# Patient Record
Sex: MALE | Race: WHITE | NOT HISPANIC OR LATINO
[De-identification: names, ages, dates, MRNs, and addresses within clinical notes are randomized per-mention and may not be internally consistent; named-entity substitution may affect disease eponyms.]

---

## 2022-01-10 ENCOUNTER — TRANSCRIPTION ENCOUNTER (OUTPATIENT)
Age: 52
End: 2022-01-10

## 2022-10-03 ENCOUNTER — TRANSCRIPTION ENCOUNTER (OUTPATIENT)
Age: 52
End: 2022-10-03

## 2022-10-04 PROBLEM — Z00.00 ENCOUNTER FOR PREVENTIVE HEALTH EXAMINATION: Status: ACTIVE | Noted: 2022-10-04

## 2022-10-10 ENCOUNTER — APPOINTMENT (OUTPATIENT)
Dept: CARDIOLOGY | Facility: CLINIC | Age: 52
End: 2022-10-10

## 2022-10-10 ENCOUNTER — NON-APPOINTMENT (OUTPATIENT)
Age: 52
End: 2022-10-10

## 2022-10-10 VITALS — DIASTOLIC BLOOD PRESSURE: 90 MMHG | SYSTOLIC BLOOD PRESSURE: 182 MMHG

## 2022-10-10 VITALS
WEIGHT: 315 LBS | HEIGHT: 74 IN | DIASTOLIC BLOOD PRESSURE: 90 MMHG | BODY MASS INDEX: 40.43 KG/M2 | OXYGEN SATURATION: 95 % | TEMPERATURE: 97.8 F | SYSTOLIC BLOOD PRESSURE: 170 MMHG | HEART RATE: 71 BPM

## 2022-10-10 DIAGNOSIS — Z87.2 PERSONAL HISTORY OF DISEASES OF THE SKIN AND SUBCUTANEOUS TISSUE: ICD-10-CM

## 2022-10-10 DIAGNOSIS — Z87.39 PERSONAL HISTORY OF OTHER DISEASES OF THE MUSCULOSKELETAL SYSTEM AND CONNECTIVE TISSUE: ICD-10-CM

## 2022-10-10 DIAGNOSIS — Z82.3 FAMILY HISTORY OF STROKE: ICD-10-CM

## 2022-10-10 DIAGNOSIS — Z87.820 PERSONAL HISTORY OF TRAUMATIC BRAIN INJURY: ICD-10-CM

## 2022-10-10 DIAGNOSIS — Z82.49 FAMILY HISTORY OF ISCHEMIC HEART DISEASE AND OTHER DISEASES OF THE CIRCULATORY SYSTEM: ICD-10-CM

## 2022-10-10 DIAGNOSIS — Z78.9 OTHER SPECIFIED HEALTH STATUS: ICD-10-CM

## 2022-10-10 PROCEDURE — 93000 ELECTROCARDIOGRAM COMPLETE: CPT

## 2022-10-10 PROCEDURE — 99215 OFFICE O/P EST HI 40 MIN: CPT | Mod: 25

## 2022-10-10 RX ORDER — APIXABAN 5 MG/1
5 TABLET, FILM COATED ORAL
Qty: 180 | Refills: 3 | Status: DISCONTINUED | COMMUNITY
End: 2022-10-10

## 2022-11-01 ENCOUNTER — TRANSCRIPTION ENCOUNTER (OUTPATIENT)
Age: 52
End: 2022-11-01

## 2022-11-03 ENCOUNTER — NON-APPOINTMENT (OUTPATIENT)
Age: 52
End: 2022-11-03

## 2022-11-23 ENCOUNTER — APPOINTMENT (OUTPATIENT)
Dept: CARDIOLOGY | Facility: CLINIC | Age: 52
End: 2022-11-23

## 2023-02-16 ENCOUNTER — NON-APPOINTMENT (OUTPATIENT)
Age: 53
End: 2023-02-16

## 2023-02-27 ENCOUNTER — APPOINTMENT (OUTPATIENT)
Dept: CARDIOLOGY | Facility: CLINIC | Age: 53
End: 2023-02-27

## 2023-04-04 ENCOUNTER — NON-APPOINTMENT (OUTPATIENT)
Age: 53
End: 2023-04-04

## 2023-04-11 ENCOUNTER — APPOINTMENT (OUTPATIENT)
Dept: CARDIOLOGY | Facility: CLINIC | Age: 53
End: 2023-04-11

## 2023-05-31 ENCOUNTER — APPOINTMENT (OUTPATIENT)
Dept: CARDIOLOGY | Facility: CLINIC | Age: 53
End: 2023-05-31
Payer: COMMERCIAL

## 2023-05-31 ENCOUNTER — NON-APPOINTMENT (OUTPATIENT)
Age: 53
End: 2023-05-31

## 2023-05-31 VITALS
BODY MASS INDEX: 40.43 KG/M2 | SYSTOLIC BLOOD PRESSURE: 134 MMHG | HEIGHT: 74 IN | OXYGEN SATURATION: 93 % | DIASTOLIC BLOOD PRESSURE: 74 MMHG | WEIGHT: 315 LBS | HEART RATE: 87 BPM

## 2023-05-31 DIAGNOSIS — G89.29 DORSALGIA, UNSPECIFIED: ICD-10-CM

## 2023-05-31 DIAGNOSIS — R60.0 LOCALIZED EDEMA: ICD-10-CM

## 2023-05-31 DIAGNOSIS — I48.91 UNSPECIFIED ATRIAL FIBRILLATION: ICD-10-CM

## 2023-05-31 DIAGNOSIS — I48.92 UNSPECIFIED ATRIAL FIBRILLATION: ICD-10-CM

## 2023-05-31 DIAGNOSIS — E87.6 HYPOKALEMIA: ICD-10-CM

## 2023-05-31 DIAGNOSIS — E66.9 OBESITY, UNSPECIFIED: ICD-10-CM

## 2023-05-31 DIAGNOSIS — Z99.89 OBSTRUCTIVE SLEEP APNEA (ADULT) (PEDIATRIC): ICD-10-CM

## 2023-05-31 DIAGNOSIS — G47.33 OBSTRUCTIVE SLEEP APNEA (ADULT) (PEDIATRIC): ICD-10-CM

## 2023-05-31 DIAGNOSIS — M54.9 DORSALGIA, UNSPECIFIED: ICD-10-CM

## 2023-05-31 DIAGNOSIS — I10 ESSENTIAL (PRIMARY) HYPERTENSION: ICD-10-CM

## 2023-05-31 PROCEDURE — 93000 ELECTROCARDIOGRAM COMPLETE: CPT

## 2023-05-31 PROCEDURE — 99215 OFFICE O/P EST HI 40 MIN: CPT | Mod: 25

## 2023-06-04 ENCOUNTER — TRANSCRIPTION ENCOUNTER (OUTPATIENT)
Age: 53
End: 2023-06-04

## 2023-07-16 ENCOUNTER — RX CHANGE (OUTPATIENT)
Age: 53
End: 2023-07-16

## 2023-08-02 ENCOUNTER — APPOINTMENT (OUTPATIENT)
Dept: CARDIOLOGY | Facility: CLINIC | Age: 53
End: 2023-08-02

## 2023-09-07 RX ORDER — SEMAGLUTIDE 0.68 MG/ML
2 INJECTION, SOLUTION SUBCUTANEOUS
Qty: 4 | Refills: 2 | Status: DISCONTINUED | COMMUNITY
Start: 2022-10-10 | End: 2023-09-07

## 2023-10-29 RX ORDER — METOPROLOL TARTRATE 50 MG/1
50 TABLET, FILM COATED ORAL TWICE DAILY
Qty: 180 | Refills: 3 | Status: ACTIVE | COMMUNITY
Start: 1900-01-01 | End: 1900-01-01

## 2023-12-08 ENCOUNTER — RX RENEWAL (OUTPATIENT)
Age: 53
End: 2023-12-08

## 2023-12-10 ENCOUNTER — TRANSCRIPTION ENCOUNTER (OUTPATIENT)
Age: 53
End: 2023-12-10

## 2023-12-10 RX ORDER — AMLODIPINE BESYLATE 5 MG/1
5 TABLET ORAL
Qty: 90 | Refills: 3 | Status: ACTIVE | COMMUNITY
Start: 2022-10-06 | End: 1900-01-01

## 2023-12-16 LAB — HBA1C MFR BLD HPLC: 6.3

## 2023-12-18 ENCOUNTER — APPOINTMENT (OUTPATIENT)
Dept: CARDIOLOGY | Facility: CLINIC | Age: 53
End: 2023-12-18

## 2023-12-18 ENCOUNTER — NON-APPOINTMENT (OUTPATIENT)
Age: 53
End: 2023-12-18

## 2024-01-11 ENCOUNTER — TRANSCRIPTION ENCOUNTER (OUTPATIENT)
Age: 54
End: 2024-01-11

## 2024-01-25 ENCOUNTER — RX RENEWAL (OUTPATIENT)
Age: 54
End: 2024-01-25

## 2024-01-30 ENCOUNTER — OFFICE (OUTPATIENT)
Facility: LOCATION | Age: 54
Setting detail: OPHTHALMOLOGY
End: 2024-01-30
Payer: COMMERCIAL

## 2024-01-30 DIAGNOSIS — H02.834: ICD-10-CM

## 2024-01-30 DIAGNOSIS — H02.831: ICD-10-CM

## 2024-01-30 DIAGNOSIS — H31.29: ICD-10-CM

## 2024-01-30 DIAGNOSIS — H10.433: ICD-10-CM

## 2024-01-30 DIAGNOSIS — H02.89: ICD-10-CM

## 2024-01-30 PROCEDURE — 99203 OFFICE O/P NEW LOW 30 MIN: CPT | Performed by: OPHTHALMOLOGY

## 2024-01-30 ASSESSMENT — CONFRONTATIONAL VISUAL FIELD TEST (CVF)
OD_FINDINGS: FULL
OS_FINDINGS: FULL

## 2024-02-01 PROBLEM — H02.89 FLOPPY EYELID SYNDROME: Status: ACTIVE | Noted: 2024-01-30

## 2024-02-01 PROBLEM — H02.83 DERMATOCHALASIS ; BOTH EYES: Status: ACTIVE | Noted: 2024-01-30

## 2024-02-01 PROBLEM — H10.433 CONJUNCTIVITIS CHRONIC FOLLICULAR; BOTH EYES: Status: ACTIVE | Noted: 2024-01-30

## 2024-02-01 PROBLEM — H31.29 PERIPAPILLARY ATROPHY ; BOTH EYES: Status: ACTIVE | Noted: 2024-01-30

## 2024-04-16 ENCOUNTER — TRANSCRIPTION ENCOUNTER (OUTPATIENT)
Age: 54
End: 2024-04-16

## 2024-04-28 ENCOUNTER — TRANSCRIPTION ENCOUNTER (OUTPATIENT)
Age: 54
End: 2024-04-28

## 2024-04-29 ENCOUNTER — TRANSCRIPTION ENCOUNTER (OUTPATIENT)
Age: 54
End: 2024-04-29

## 2024-05-02 RX ORDER — SEMAGLUTIDE 0.25 MG/.5ML
0.25 INJECTION, SOLUTION SUBCUTANEOUS
Qty: 1 | Refills: 0 | Status: ACTIVE | COMMUNITY
Start: 2023-09-06 | End: 1900-01-01

## 2024-05-08 ENCOUNTER — NON-APPOINTMENT (OUTPATIENT)
Age: 54
End: 2024-05-08

## 2024-05-15 ENCOUNTER — APPOINTMENT (OUTPATIENT)
Dept: CARDIOLOGY | Facility: CLINIC | Age: 54
End: 2024-05-15

## 2024-05-17 ENCOUNTER — TRANSCRIPTION ENCOUNTER (OUTPATIENT)
Age: 54
End: 2024-05-17

## 2024-05-20 ENCOUNTER — TRANSCRIPTION ENCOUNTER (OUTPATIENT)
Age: 54
End: 2024-05-20

## 2024-05-28 RX ORDER — SPIRONOLACTONE 25 MG/1
25 TABLET ORAL
Qty: 90 | Refills: 0 | Status: ACTIVE | COMMUNITY
Start: 2022-10-06 | End: 1900-01-01

## 2024-05-29 ENCOUNTER — TRANSCRIPTION ENCOUNTER (OUTPATIENT)
Age: 54
End: 2024-05-29

## 2024-06-01 ENCOUNTER — TRANSCRIPTION ENCOUNTER (OUTPATIENT)
Age: 54
End: 2024-06-01

## 2024-06-01 RX ORDER — RIVAROXABAN 20 MG/1
20 TABLET, FILM COATED ORAL
Qty: 90 | Refills: 0 | Status: ACTIVE | COMMUNITY
Start: 2022-10-10 | End: 1900-01-01

## 2024-06-13 ENCOUNTER — APPOINTMENT (OUTPATIENT)
Dept: CARDIOLOGY | Facility: CLINIC | Age: 54
End: 2024-06-13

## 2024-07-12 ENCOUNTER — TRANSCRIPTION ENCOUNTER (OUTPATIENT)
Age: 54
End: 2024-07-12

## 2024-07-23 ENCOUNTER — APPOINTMENT (OUTPATIENT)
Dept: CARDIOLOGY | Facility: CLINIC | Age: 54
End: 2024-07-23

## 2024-08-29 ENCOUNTER — TRANSCRIPTION ENCOUNTER (OUTPATIENT)
Age: 54
End: 2024-08-29

## 2024-09-16 ENCOUNTER — TRANSCRIPTION ENCOUNTER (OUTPATIENT)
Age: 54
End: 2024-09-16

## 2024-09-17 ENCOUNTER — RX RENEWAL (OUTPATIENT)
Age: 54
End: 2024-09-17

## 2024-09-25 ENCOUNTER — TRANSCRIPTION ENCOUNTER (OUTPATIENT)
Age: 54
End: 2024-09-25

## 2024-09-28 ENCOUNTER — TRANSCRIPTION ENCOUNTER (OUTPATIENT)
Age: 54
End: 2024-09-28

## 2024-10-01 ENCOUNTER — NON-APPOINTMENT (OUTPATIENT)
Age: 54
End: 2024-10-01

## 2024-10-02 ENCOUNTER — APPOINTMENT (OUTPATIENT)
Dept: CARDIOLOGY | Facility: CLINIC | Age: 54
End: 2024-10-02
Payer: COMMERCIAL

## 2024-10-02 VITALS
SYSTOLIC BLOOD PRESSURE: 134 MMHG | HEIGHT: 74 IN | BODY MASS INDEX: 40.43 KG/M2 | DIASTOLIC BLOOD PRESSURE: 80 MMHG | HEART RATE: 87 BPM | OXYGEN SATURATION: 96 % | WEIGHT: 315 LBS

## 2024-10-02 DIAGNOSIS — M54.9 DORSALGIA, UNSPECIFIED: ICD-10-CM

## 2024-10-02 DIAGNOSIS — I48.92 UNSPECIFIED ATRIAL FIBRILLATION: ICD-10-CM

## 2024-10-02 DIAGNOSIS — G47.33 OBSTRUCTIVE SLEEP APNEA (ADULT) (PEDIATRIC): ICD-10-CM

## 2024-10-02 DIAGNOSIS — E66.9 OBESITY, UNSPECIFIED: ICD-10-CM

## 2024-10-02 DIAGNOSIS — E87.6 HYPOKALEMIA: ICD-10-CM

## 2024-10-02 DIAGNOSIS — I48.91 UNSPECIFIED ATRIAL FIBRILLATION: ICD-10-CM

## 2024-10-02 DIAGNOSIS — R60.0 LOCALIZED EDEMA: ICD-10-CM

## 2024-10-02 DIAGNOSIS — G89.29 DORSALGIA, UNSPECIFIED: ICD-10-CM

## 2024-10-02 DIAGNOSIS — I10 ESSENTIAL (PRIMARY) HYPERTENSION: ICD-10-CM

## 2024-10-02 PROCEDURE — 93000 ELECTROCARDIOGRAM COMPLETE: CPT

## 2024-10-02 PROCEDURE — G2211 COMPLEX E/M VISIT ADD ON: CPT | Mod: NC

## 2024-10-02 PROCEDURE — 99214 OFFICE O/P EST MOD 30 MIN: CPT

## 2024-10-02 RX ORDER — CHLORTHALIDONE 25 MG/1
25 TABLET ORAL DAILY
Qty: 90 | Refills: 3 | Status: ACTIVE | COMMUNITY
Start: 2024-10-02 | End: 1900-01-01

## 2024-10-02 NOTE — ASSESSMENT
[FreeTextEntry1] : Paroxysmal atrial fibrillation -s/p  DC cardioversion October 5, 2022, on   Xarelto 20 mg daily.  He had normal LV function on DEANDRE.  Morbid obesity -obesity associated border of diabetes, hypertension, CVA, MI, variety of cancer has been discussed with him at great length; weight reduction diet and exercise been stressed upon him.  Increase Wegovy to 1 mg injection every weekly.  Low-calorie diet has been discussed with him..  Sleep apnea -he is not able to tolerate CPAP machine  Hypertension -continue medication and I also advised him to resume his valsartan in which she was taking outpatient.  Low-salt diet.  BP monitoring.  Now he has edema of the legs.  Discontinue Norvasc and start chlorthalidone 25 mg daily, BMP 1 week.  Prediabetes -long-term goals blood pressure less than 130/80, A1c less than 7, LDL less than 70; diabetic diet; continue Wegovy  Preop for epidural injections -clinically has no evidence of ACS or CHF; he is in normal sinus rhythm; cardiac wise seems to be maximized for the planned procedure.  Proceed without delay.  Perioperative close monitor vitals.  Xarelto can be held 3 days prior to the procedure and to be tested as soon as procedure permits.  Please call me if I can assist you further  Aggressive risk factor monitoring has been discussed with him in great length in regular walking, compliance to medication, low-cholesterol diet.  He will be reeval by me in 2 months

## 2024-10-02 NOTE — HISTORY OF PRESENT ILLNESS
[FreeTextEntry1] : 54 years old gentleman with history of    osteoarthritis, sleep apnea syndrome, PAF , s/p DEANDRE guided [facility]  October 5, 2022   hypertension, chronic low back pain with unsuccessful ablation epidural who has been planned for epidural injection came for cardiac evaluation.  He denies any chest pain, PND, orthopnea, diaphoresis, dizziness, palpitations.  He is always short of breath.  Currently he is on Wegovy without any weight loss.  He stated that he does not eat much.  He is not able to exercise.  He has too much pain.  He does not drink alcohol.  He has all the symptoms are suggestive, has been diagnosed with sleep but he cannot tolerate CPAP machine  Syndrome including loud habitual snoring, daytime fatigue, somnolence, witnessed apnea episodes.

## 2024-10-14 ENCOUNTER — RX RENEWAL (OUTPATIENT)
Age: 54
End: 2024-10-14

## 2024-10-31 ENCOUNTER — RX RENEWAL (OUTPATIENT)
Age: 54
End: 2024-10-31

## 2024-11-30 LAB — HBA1C MFR BLD HPLC: 5.9

## 2024-12-23 ENCOUNTER — APPOINTMENT (OUTPATIENT)
Dept: CARDIOLOGY | Facility: CLINIC | Age: 54
End: 2024-12-23

## 2025-03-05 ENCOUNTER — TRANSCRIPTION ENCOUNTER (OUTPATIENT)
Age: 55
End: 2025-03-05

## 2025-04-21 ENCOUNTER — APPOINTMENT (OUTPATIENT)
Dept: CARDIOLOGY | Facility: CLINIC | Age: 55
End: 2025-04-21

## 2025-08-05 ENCOUNTER — APPOINTMENT (OUTPATIENT)
Dept: CARDIOLOGY | Facility: CLINIC | Age: 55
End: 2025-08-05